# Patient Record
Sex: MALE | Race: WHITE
[De-identification: names, ages, dates, MRNs, and addresses within clinical notes are randomized per-mention and may not be internally consistent; named-entity substitution may affect disease eponyms.]

---

## 2014-11-23 VITALS — DIASTOLIC BLOOD PRESSURE: 81 MMHG | SYSTOLIC BLOOD PRESSURE: 136 MMHG

## 2020-05-15 ENCOUNTER — HOSPITAL ENCOUNTER (OUTPATIENT)
Dept: HOSPITAL 63 - LAB | Age: 70
Discharge: HOME | End: 2020-05-15
Attending: NURSE ANESTHETIST, CERTIFIED REGISTERED
Payer: MEDICARE

## 2020-05-15 DIAGNOSIS — Z03.818: Primary | ICD-10-CM

## 2020-05-15 PROCEDURE — 87635 SARS-COV-2 COVID-19 AMP PRB: CPT

## 2020-05-19 ENCOUNTER — HOSPITAL ENCOUNTER (OUTPATIENT)
Dept: HOSPITAL 63 - SURG | Age: 70
End: 2020-05-19
Attending: INTERNAL MEDICINE
Payer: MEDICARE

## 2020-05-19 VITALS — SYSTOLIC BLOOD PRESSURE: 145 MMHG | DIASTOLIC BLOOD PRESSURE: 80 MMHG

## 2020-05-19 DIAGNOSIS — R13.10: Primary | ICD-10-CM

## 2020-05-19 DIAGNOSIS — K44.9: ICD-10-CM

## 2020-05-19 DIAGNOSIS — E78.5: ICD-10-CM

## 2020-05-19 DIAGNOSIS — K29.50: ICD-10-CM

## 2020-05-19 DIAGNOSIS — K21.0: ICD-10-CM

## 2020-05-19 DIAGNOSIS — Z85.828: ICD-10-CM

## 2020-05-19 DIAGNOSIS — Z79.82: ICD-10-CM

## 2020-05-19 DIAGNOSIS — Z72.89: ICD-10-CM

## 2020-05-19 PROCEDURE — 43450 DILATE ESOPHAGUS 1/MULT PASS: CPT

## 2020-05-19 PROCEDURE — 88342 IMHCHEM/IMCYTCHM 1ST ANTB: CPT

## 2020-05-19 PROCEDURE — 88305 TISSUE EXAM BY PATHOLOGIST: CPT

## 2020-05-19 PROCEDURE — 43239 EGD BIOPSY SINGLE/MULTIPLE: CPT

## 2020-05-19 PROCEDURE — 88312 SPECIAL STAINS GROUP 1: CPT

## 2020-05-21 NOTE — PATHOLOGY
[facility] Accession Number: [id number]

.                                                                01

Material submitted:                                        .

PART A: stomach - GASTRIC BX

PART B: esophagus - ESOPHAGEAL BX

.                                                                01

Clinical history:                                          .

Dysphagia

.                                                                02

**********************************************************************

Diagnosis:

A.  Gastric biopsy "gastric biopsy":

- Mild chronic reactive gastropathy.

- There is no evidence of atypia or malignancy.

- The immunoperoxidase stain for Helicobacter pylori is negative.

.

B.  Squamous mucosa and submucosa "esophageal biopsy":

- Reflux esophagitis with focal ulceration and with acute and chronic

inflammation and chronic esophagitis.

- There is no evidence of goblet cell metaplasia, dysplasia or malignancy.

.

([facility]:; 05/21/2020)

[facility]  05/21/2020  1025 Local

**********************************************************************

.                                                                02

Comment:

A GMS stain for fungus will be done and additional report will follow.

([facility]:; 05/21/2020)

.                                                                02

Electronically signed:                                     .

Michael Kaiser MD, Pathologist

NPI- [id number]

.                                                                01

Gross description:                                         .

A.  The specimen is received in formalin labeled "Clapsaddle, David,

gastric BX" and consists of a fragment of tan tissue measuring 0.4 x 0.3 x

0.2 cm which is entirely submitted in A1.

.

B.  The specimen is received in formalin labeled "Clapsaddle, David,

esophageal BX" and consists of multiple fragments of white-tan tissue

measuring 0.6 x 0.3 x 0.2 cm in aggregate which are entirely submitted in

B1.

([facility]; 5/20/2020)

JFQ/JFQ  05/20/2020  1556 Local

.                                                                02

Pathologist provided ICD-10:

K31.9, K21.0, K22.10, K20.9

.                                                                02

CPT                                                        .

418546, [id number], [id number]

Specimen Comment: A courtesy copy of this report has been sent to [phone]

Specimen Comment: Report sent to 

***Performed at:  01

   [address], [place]  [id number]

   MD Jay Quintanilla MD Phone:  [phone]

***Performed at:  02

   [facility]

   [address] [place], [place]  [id number]

   MD Gil Hensley MD Phone:  [phone]